# Patient Record
Sex: FEMALE | Race: WHITE | Employment: FULL TIME | URBAN - METROPOLITAN AREA
[De-identification: names, ages, dates, MRNs, and addresses within clinical notes are randomized per-mention and may not be internally consistent; named-entity substitution may affect disease eponyms.]

---

## 2019-05-08 ENCOUNTER — ANESTHESIA EVENT (OUTPATIENT)
Dept: ENDOSCOPY | Age: 44
End: 2019-05-08
Payer: COMMERCIAL

## 2019-05-08 RX ORDER — LISINOPRIL 20 MG/1
TABLET ORAL
COMMUNITY
Start: 2019-01-31

## 2019-05-08 RX ORDER — ALBUTEROL SULFATE 90 UG/1
2 AEROSOL, METERED RESPIRATORY (INHALATION) PRN
COMMUNITY
Start: 2019-02-06

## 2019-05-08 NOTE — PROGRESS NOTES
[address] [facility] time______10______        Surgery time____________    Take the following medications with a sip of water:lisinopril and inhaler    Do not eat or drink anything after 12:00 midnight prior to your surgery. This includes water chewing gum, mints and ice chips. You may brush your teeth and gargle the morning of your surgery, but do not swallow the water     Please see your family doctor/pediatrician for a history and physical and/or concerning medications. Bring any test results/reports from your physicians office. If you are under the care of a heart doctor or specialist doctor, please be aware that you may be asked to them for clearance    You may be asked to stop blood thinners such as Coumadin, Plavix, Fragmin, Lovenox, etc., or any anti-inflammatories such as:  Aspirin, Ibuprofen, Advil, Naproxen prior to your surgery. We also ask that you stop any OTC medications such as fish oil, vitamin E, glucosamine, garlic, Multivitamins, COQ 10, etc.    We ask that you do not smoke 24 hours prior to surgery  We ask that you do not  drink any alcoholic beverages 24 hours prior to surgery     You must make arrangements for a responsible adult to take you home after your surgery. For your safety you will not be allowed to leave alone or drive yourself home. Your surgery will be cancelled if you do not have a ride home. Also for your safety, it is strongly suggested that someone stay with you the first 24 hours after your surgery. A parent or legal guardian must accompany a child scheduled for surgery and plan to stay at the hospital until the child is discharged. Please do not bring other children with you. For your comfort, please wear simple loose fitting clothing to the hospital.  Please do not bring valuables.     Do not wear any make-up or nail polish on your fingers or toes      For your safety, please do not wear any jewelry or body piercing's on the day of surgery. All jewelry must be removed. If you have dentures, they will be removed before going to operating room. For your convenience, we will provide you with a container. If you wear contact lenses or glasses, they will be removed, please bring a case for them. If you have a living will and a durable power of  for healthcare, please bring in a copy. As part of our patient safety program to minimize surgical site infections, we ask you to do the following:    · Please notify your surgeon if you develop any illness between         now and the  day of your surgery. · This includes a cough, cold, fever, sore throat, nausea,         or vomiting, and diarrhea, etc.  ·  Please notify your surgeon if you experience dizziness, shortness         of breath or blurred vision between now and the time of your surgery. Do not shave your operative site 96 hours prior to surgery. For face and neck surgery, men may use an electric razor 48 hours   prior to surgery. You may shower the night before surgery or the morning of   your surgery with an antibacterial soap. You will need to bring a photo ID and insurance card    [facility] has an onsite pharmacy, would you like to utilize our pharmacy     If you will be staying overnight and use a C-pap machine, please bring   your C-pap to hospital     Our goal is to provide you with excellent care, therefore, visitors will be limited to two(2) in the room at a time so that we may focus on providing this care for you. Please contact pre-admission testing if you have any further questions. [facility] phone number:  [phone] [facility] fax number:  [phone]  Please note these are generalized instructions for all surgical cases, you may be provided with more specific instructions according to your surgery.

## 2019-05-09 ENCOUNTER — ANESTHESIA (OUTPATIENT)
Dept: ENDOSCOPY | Age: 44
End: 2019-05-09
Payer: COMMERCIAL

## 2019-05-09 ENCOUNTER — HOSPITAL ENCOUNTER (OUTPATIENT)
Age: 44
Setting detail: OUTPATIENT SURGERY
Discharge: HOME | End: 2019-05-09
Attending: INTERNAL MEDICINE | Admitting: INTERNAL MEDICINE
Payer: COMMERCIAL

## 2019-05-09 VITALS
DIASTOLIC BLOOD PRESSURE: 84 MMHG | RESPIRATION RATE: 25 BRPM | OXYGEN SATURATION: 96 % | SYSTOLIC BLOOD PRESSURE: 141 MMHG

## 2019-05-09 VITALS
DIASTOLIC BLOOD PRESSURE: 74 MMHG | HEIGHT: 62 IN | BODY MASS INDEX: 53.92 KG/M2 | RESPIRATION RATE: 18 BRPM | SYSTOLIC BLOOD PRESSURE: 149 MMHG | WEIGHT: 293 LBS | TEMPERATURE: 97.4 F | OXYGEN SATURATION: 97 % | HEART RATE: 59 BPM

## 2019-05-09 DIAGNOSIS — Z12.11 COLON CANCER SCREENING: ICD-10-CM

## 2019-05-09 LAB — PREGNANCY, URINE: NEGATIVE

## 2019-05-09 PROCEDURE — 3609010600 HC COLONOSCOPY POLYPECTOMY SNARE/COLD BIOPSY: Performed by: INTERNAL MEDICINE

## 2019-05-09 PROCEDURE — 2500000003 HC RX 250 WO HCPCS: Performed by: ANESTHESIOLOGY

## 2019-05-09 PROCEDURE — 7100000001 HC PACU RECOVERY - ADDTL 15 MIN: Performed by: INTERNAL MEDICINE

## 2019-05-09 PROCEDURE — 6360000002 HC RX W HCPCS: Performed by: NURSE ANESTHETIST, CERTIFIED REGISTERED

## 2019-05-09 PROCEDURE — 7100000011 HC PHASE II RECOVERY - ADDTL 15 MIN: Performed by: INTERNAL MEDICINE

## 2019-05-09 PROCEDURE — 7100000000 HC PACU RECOVERY - FIRST 15 MIN: Performed by: INTERNAL MEDICINE

## 2019-05-09 PROCEDURE — 7100000010 HC PHASE II RECOVERY - FIRST 15 MIN: Performed by: INTERNAL MEDICINE

## 2019-05-09 PROCEDURE — 88305 TISSUE EXAM BY PATHOLOGIST: CPT

## 2019-05-09 PROCEDURE — 2580000003 HC RX 258: Performed by: ANESTHESIOLOGY

## 2019-05-09 PROCEDURE — 84703 CHORIONIC GONADOTROPIN ASSAY: CPT

## 2019-05-09 PROCEDURE — 6360000002 HC RX W HCPCS: Performed by: ANESTHESIOLOGY

## 2019-05-09 PROCEDURE — 2709999900 HC NON-CHARGEABLE SUPPLY: Performed by: INTERNAL MEDICINE

## 2019-05-09 PROCEDURE — 2500000003 HC RX 250 WO HCPCS: Performed by: NURSE ANESTHETIST, CERTIFIED REGISTERED

## 2019-05-09 PROCEDURE — 3700000000 HC ANESTHESIA ATTENDED CARE: Performed by: INTERNAL MEDICINE

## 2019-05-09 PROCEDURE — 3700000001 HC ADD 15 MINUTES (ANESTHESIA): Performed by: INTERNAL MEDICINE

## 2019-05-09 RX ORDER — PROPOFOL 10 MG/ML
INJECTION, EMULSION INTRAVENOUS CONTINUOUS PRN
Status: DISCONTINUED | OUTPATIENT
Start: 2019-05-09 | End: 2019-05-09 | Stop reason: SDUPTHER

## 2019-05-09 RX ORDER — PROPOFOL 10 MG/ML
INJECTION, EMULSION INTRAVENOUS PRN
Status: DISCONTINUED | OUTPATIENT
Start: 2019-05-09 | End: 2019-05-09 | Stop reason: SDUPTHER

## 2019-05-09 RX ORDER — SODIUM CHLORIDE 0.9 % (FLUSH) 0.9 %
10 SYRINGE (ML) INJECTION EVERY 12 HOURS SCHEDULED
Status: DISCONTINUED | OUTPATIENT
Start: 2019-05-09 | End: 2019-05-09 | Stop reason: HOSPADM

## 2019-05-09 RX ORDER — SODIUM CHLORIDE 0.9 % (FLUSH) 0.9 %
10 SYRINGE (ML) INJECTION PRN
Status: DISCONTINUED | OUTPATIENT
Start: 2019-05-09 | End: 2019-05-09 | Stop reason: HOSPADM

## 2019-05-09 RX ORDER — ONDANSETRON 2 MG/ML
4 INJECTION INTRAMUSCULAR; INTRAVENOUS ONCE
Status: COMPLETED | OUTPATIENT
Start: 2019-05-09 | End: 2019-05-09

## 2019-05-09 RX ORDER — ONDANSETRON 2 MG/ML
4 INJECTION INTRAMUSCULAR; INTRAVENOUS
Status: DISCONTINUED | OUTPATIENT
Start: 2019-05-09 | End: 2019-05-09 | Stop reason: HOSPADM

## 2019-05-09 RX ORDER — SODIUM CHLORIDE 9 MG/ML
INJECTION, SOLUTION INTRAVENOUS CONTINUOUS
Status: DISCONTINUED | OUTPATIENT
Start: 2019-05-09 | End: 2019-05-09 | Stop reason: HOSPADM

## 2019-05-09 RX ORDER — LIDOCAINE HYDROCHLORIDE 20 MG/ML
INJECTION, SOLUTION EPIDURAL; INFILTRATION; INTRACAUDAL; PERINEURAL PRN
Status: DISCONTINUED | OUTPATIENT
Start: 2019-05-09 | End: 2019-05-09 | Stop reason: SDUPTHER

## 2019-05-09 RX ADMIN — LIDOCAINE HYDROCHLORIDE 50 MG: 20 INJECTION, SOLUTION EPIDURAL; INFILTRATION; INTRACAUDAL; PERINEURAL at 11:35

## 2019-05-09 RX ADMIN — FAMOTIDINE 20 MG: 10 INJECTION, SOLUTION INTRAVENOUS at 11:03

## 2019-05-09 RX ADMIN — ONDANSETRON 4 MG: 2 INJECTION INTRAMUSCULAR; INTRAVENOUS at 11:08

## 2019-05-09 RX ADMIN — PROPOFOL 120 MCG/KG/MIN: 10 INJECTION, EMULSION INTRAVENOUS at 11:37

## 2019-05-09 RX ADMIN — PROPOFOL 50 MG: 10 INJECTION, EMULSION INTRAVENOUS at 11:35

## 2019-05-09 RX ADMIN — PROPOFOL 50 MG: 10 INJECTION, EMULSION INTRAVENOUS at 11:39

## 2019-05-09 RX ADMIN — SODIUM CHLORIDE: 9 INJECTION, SOLUTION INTRAVENOUS at 11:03

## 2019-05-09 ASSESSMENT — PULMONARY FUNCTION TESTS
PIF_VALUE: 0

## 2019-05-09 ASSESSMENT — PAIN SCALES - GENERAL
PAINLEVEL_OUTOF10: 0

## 2019-05-09 ASSESSMENT — ENCOUNTER SYMPTOMS: SHORTNESS OF BREATH: 0

## 2019-05-09 ASSESSMENT — PAIN - FUNCTIONAL ASSESSMENT: PAIN_FUNCTIONAL_ASSESSMENT: 0-10

## 2019-05-09 NOTE — PROGRESS NOTES
To pacu from endo. Pt asleep. Wakes easily. Denies pain. Abd soft. Pt back to sleep. IV infusing. Monitor in sinus rhythm.

## 2019-05-09 NOTE — ANESTHESIA POSTPROCEDURE EVALUATION
Department of Anesthesiology  Postprocedure Note    Patient: Odette Long  MRN: [id number]  YOB: 1975  Date of evaluation: 5/9/2019  Time:  5:12 PM     Procedure Summary     Date:  05/09/19 Room / Location:  [facility] 04 / [facility] ENDOSCOPY    Anesthesia Start:  1129 Anesthesia Stop:  8147    Procedure:  COLONOSCOPY POLYPECTOMY SNARE/COLD BIOPSY (N/A ) Diagnosis:       Colon cancer screening      (COLON CANCER SCREENING)    Surgeon:  Sarah Carr MD Responsible Provider:  Hernan Matos MD    Anesthesia Type:  TIVA ASA Status:  3          Anesthesia Type: TIVA    Alissa Phase I: Alissa Score: 10    Alissa Phase II: Alissa Score: 10    Last vitals: Reviewed and per EMR flowsheets.    Vitals:    05/09/19 1216 05/09/19 1217 05/09/19 1225 05/09/19 1317   BP: 131/88  (!) 142/90 (!) 149/74   Pulse: 68 62 73 59   Resp: 13 13 16 18   Temp:   97.4 °F (36.3 °C)    TempSrc:   Temporal    SpO2: 94% 96% 96% 97%   Weight:       Height:           Anesthesia Post Evaluation    Patient location during evaluation: bedside  Patient participation: complete - patient participated  Level of consciousness: awake and alert  Airway patency: patent  Nausea & Vomiting: no nausea  Complications: no  Cardiovascular status: hemodynamically stable  Respiratory status: acceptable  Hydration status: euvolemic

## 2019-05-09 NOTE — ANESTHESIA PRE PROCEDURE
Department of Anesthesiology  Preprocedure Note       Name:  Veronica López   Age:  37 y.o.  :  1975                                          MRN:  [id number]         Date:  2019      Surgeon: Charlene Solis):  Jose Daniel Claire MD    Procedure: COLONOSCOPY (N/A )    Medications prior to admission:   Prior to Admission medications    Medication Sig Start Date End Date Taking? Authorizing Provider   lisinopril (PRINIVIL;ZESTRIL) 20 MG tablet  19  Yes Historical Provider, MD   metFORMIN (GLUCOPHAGE) 1000 MG tablet Take 500 mg by mouth 2 times daily (with meals)   Yes Historical Provider, MD   albuterol sulfate  (90 Base) MCG/ACT inhaler Inhale 2 puffs into the lungs as needed  19   Historical Provider, MD   Acetaminophen (TYLENOL PO) Take  by mouth. Historical Provider, MD   diclofenac sodium (VOLTAREN) 1 % GEL Apply 4 g topically 4 times daily for 30 days. 14  Bernadette Rey MD       Current medications:    No current facility-administered medications for this encounter. Allergies: Allergies   Allergen Reactions    Shellfish-Derived Products     Aspirin Nausea And Vomiting       Problem List:  There is no problem list on file for this patient.       Past Medical History:        Diagnosis Date    Hypertension     Obesity     PCOS (polycystic ovarian syndrome)     Seasonal allergies     Wears contact lenses        Past Surgical History:        Procedure Laterality Date    ANKLE SURGERY Right 2008    Dr. Kraig Lorenzana HISTORY  2014 & 2014    fistula repair (twice)    UVULECTOMY             Social History:    Social History     Tobacco Use    Smoking status: Never Smoker    Smokeless tobacco: Never Used    Tobacco comment: was a social smoker, hasnt smoked in about 15 years   Substance Use Topics    Alcohol use: Yes     Comment: occasionally                                Counseling given: Not Answered  Comment: hypothyroidism, hyperthyroidism, blood dyscrasia, arthritis               Abdominal:           Vascular:                                    Anesthesia Plan      TIVA     ASA 3       Induction: intravenous. Anesthetic plan and risks discussed with patient. Plan discussed with CRNA. This pre-anesthesia assessment may be used as a history and physical.    DOS STAFF ADDENDUM:    Pt seen and examined, chart reviewed (including anesthesia, drug and allergy history). No interval changes to history and physical examination. Anesthetic plan, risks, benefits, alternatives, and personnel involved discussed with patient. Patient verbalized an understanding and agrees to proceed.       Nelly Otto MD  May 9, 2019  10:37 AM      Nelly Otto MD   5/9/2019

## 2019-05-09 NOTE — H&P
[address] [place] [address]   Pre-operative History and Physical    Patient: Aleena Nair  : 1975  Acct#:     HISTORY OF PRESENT ILLNESS:    The patient is a 37 y.o. female who presents with Screening colonoscopy with family history of colon cancer in mother at 61 and father at 72. Last colon negative for polyps in . Past Medical History:        Diagnosis Date    Hypertension     Obesity     PCOS (polycystic ovarian syndrome)     Seasonal allergies     Wears contact lenses       Past Surgical History:        Procedure Laterality Date    ANKLE SURGERY Right 2008    Dr. Dk Grover HISTORY  2014 & 2014    fistula repair (twice)    UVULECTOMY            Medications Prior to Admission:   No current facility-administered medications on file prior to encounter. Current Outpatient Medications on File Prior to Encounter   Medication Sig Dispense Refill    albuterol sulfate  (90 Base) MCG/ACT inhaler Inhale 2 puffs into the lungs as needed       lisinopril (PRINIVIL;ZESTRIL) 20 MG tablet       metFORMIN (GLUCOPHAGE) 1000 MG tablet Take 500 mg by mouth 2 times daily (with meals)      Acetaminophen (TYLENOL PO) Take  by mouth.  diclofenac sodium (VOLTAREN) 1 % GEL Apply 4 g topically 4 times daily for 30 days.  5 Tube 2        Allergies:  Shellfish-derived products and Aspirin    Social History:   Social History     Socioeconomic History    Marital status: Single     Spouse name: Not on file    Number of children: Not on file    Years of education: Not on file    Highest education level: Not on file   Occupational History    Occupation: Anaylst     Employer: [employer]   Social Needs    Financial resource strain: Not on file    Food insecurity:     Worry: Not on file     Inability: Not on file    Transportation needs:     Medical: Not on file     Non-medical: Not on file   Tobacco Use    Smoking status: Never Smoker    Smokeless tobacco: Never Used    Tobacco comment: was a social smoker, hasnt smoked in about 15 years   Substance and Sexual Activity    Alcohol use: Yes     Comment: occasionally    Drug use: Never    Sexual activity: Yes   Lifestyle    Physical activity:     Days per week: Not on file     Minutes per session: Not on file    Stress: Not on file   Relationships    Social connections:     Talks on phone: Not on file     Gets together: Not on file     Attends Gnosticism service: Not on file     Active member of club or organization: Not on file     Attends meetings of clubs or organizations: Not on file     Relationship status: Not on file    Intimate partner violence:     Fear of current or ex partner: Not on file     Emotionally abused: Not on file     Physically abused: Not on file     Forced sexual activity: Not on file   Other Topics Concern    Not on file   Social History Narrative    Not on file      Family History:       Problem Relation Age of Onset    Cancer Other     Heart Disease Other         PHYSICAL EXAM:      BP (!) 143/110   Pulse 85   Temp 97.7 °F (36.5 °C)   Resp 14   Ht 5' 2\" (1.575 m)   Wt 294 lb 15.6 oz (133.8 kg)   LMP 04/24/2019   SpO2 98%   BMI 53.95 kg/m²  I        Heart:  RRR    Lungs:  CTA b    Abdomen:  S/NT/ND/+BS      ASSESSMENT AND PLAN:  ASA: per anesthesia  Mallampati: per anesthesia  1. Patient is a 37 y.o. female here for colonoscpoy   2. Procedure options, risks and benefits reviewed with the patient. The patient expresses understanding.     Chiki Freeman

## 2019-05-09 NOTE — OP NOTE
[address] [place] [address]  Endoscopy Note    Patient: Domenico Trevino  : 1975  Acct#:     Procedure: Colonoscopy with intubation of the terminal ileum  Colonoscopy with snare polypectomy    Date:  2019    Surgeon:  Angélica Rojo MD    Referring Physician:  Dr. Toño Hanna    Anesthesia:  TIVA    Indications: This is a 37y.o. year old female who presents today with  Screening colonoscopy with family history of colon cancer in mother at 61 and father at 72. Last colon negative for polyps in . Previous Colonoscopy: YES  Date:   Greater than 3 years: YES      Procedure: An informed consent was obtained from the patient after explanation of indications, benefits, possible risks and complications of the procedure. The patient was then taken to the endoscopy suite, placed in the left lateral decubitus position, and the above IV anesthesia was administered. A digital rectal examination was performed and revealed negative without mass, lesions or tenderness. The Olympus pediatric video colonoscope was placed in the patient's rectum under digital direction and advanced to the cecum. The cecum was identified by characteristic anatomy and ballottment. The prep was good. The ileocecal valve was identified and intubated. Retroflexed view of the cecum was normal.  The ascending colon was examined twice to assure no sessile polyps missed. The scope was then withdrawn back through the cecum, ascending, transverse, descending and sigmoid colons. Carefull circumferential examination of the mucosa in these areas was performed. The scope was then withdrawn into the rectum and retroflexed. The scope was straightened, the colon was decompressed and the scope was withdrawn from the patient. Findings:  1. Normal Ileum  2. A 9mm polyp was identified in the ascending colon and removed completely with hot snare polypectomy down to a clean base confirmed by post-polypectomy photograph.   All

## 2020-02-11 ENCOUNTER — OFFICE VISIT (OUTPATIENT)
Dept: ORTHOPEDIC SURGERY | Age: 45
End: 2020-02-11
Payer: COMMERCIAL

## 2020-02-11 VITALS — WEIGHT: 293 LBS | BODY MASS INDEX: 53.92 KG/M2 | HEIGHT: 62 IN

## 2020-02-11 PROCEDURE — G8417 CALC BMI ABV UP PARAM F/U: HCPCS | Performed by: ORTHOPAEDIC SURGERY

## 2020-02-11 PROCEDURE — 20610 DRAIN/INJ JOINT/BURSA W/O US: CPT | Performed by: ORTHOPAEDIC SURGERY

## 2020-02-11 PROCEDURE — 99203 OFFICE O/P NEW LOW 30 MIN: CPT | Performed by: ORTHOPAEDIC SURGERY

## 2020-02-11 PROCEDURE — G8484 FLU IMMUNIZE NO ADMIN: HCPCS | Performed by: ORTHOPAEDIC SURGERY

## 2020-02-11 PROCEDURE — G8427 DOCREV CUR MEDS BY ELIG CLIN: HCPCS | Performed by: ORTHOPAEDIC SURGERY

## 2020-02-11 PROCEDURE — 1036F TOBACCO NON-USER: CPT | Performed by: ORTHOPAEDIC SURGERY

## 2020-02-11 RX ORDER — HYDROCODONE BITARTRATE AND ACETAMINOPHEN 5; 325 MG/1; MG/1
TABLET ORAL
COMMUNITY
Start: 2020-02-09 | End: 2021-06-24 | Stop reason: ALTCHOICE

## 2020-02-11 RX ORDER — METHYLPREDNISOLONE ACETATE 40 MG/ML
80 INJECTION, SUSPENSION INTRA-ARTICULAR; INTRALESIONAL; INTRAMUSCULAR; SOFT TISSUE ONCE
Status: COMPLETED | OUTPATIENT
Start: 2020-02-11 | End: 2020-02-11

## 2020-02-11 RX ORDER — NAPROXEN 500 MG/1
500 TABLET ORAL 2 TIMES DAILY WITH MEALS
Qty: 60 TABLET | Refills: 0 | Status: SHIPPED | OUTPATIENT
Start: 2020-02-11 | End: 2020-05-26

## 2020-02-11 RX ORDER — LIDOCAINE HYDROCHLORIDE 10 MG/ML
3 INJECTION, SOLUTION INFILTRATION; PERINEURAL ONCE
Status: COMPLETED | OUTPATIENT
Start: 2020-02-11 | End: 2020-02-11

## 2020-02-11 RX ORDER — NAPROXEN 500 MG/1
TABLET ORAL
COMMUNITY
Start: 2020-02-09 | End: 2020-02-11 | Stop reason: ALTCHOICE

## 2020-02-11 RX ADMIN — METHYLPREDNISOLONE ACETATE 80 MG: 40 INJECTION, SUSPENSION INTRA-ARTICULAR; INTRALESIONAL; INTRAMUSCULAR; SOFT TISSUE at 14:41

## 2020-02-11 RX ADMIN — LIDOCAINE HYDROCHLORIDE 3 ML: 10 INJECTION, SOLUTION INFILTRATION; PERINEURAL at 14:40

## 2020-02-11 ASSESSMENT — ENCOUNTER SYMPTOMS
ALLERGIC/IMMUNOLOGIC COMMENTS: SHELLFISH
GASTROINTESTINAL NEGATIVE: 1
EYES NEGATIVE: 1
RESPIRATORY NEGATIVE: 1

## 2020-02-11 NOTE — PROGRESS NOTES
Subjective:      Patient ID: Ron Meyers is a 53 y.o. female. TONE Meyers is seen today for evaluation of right knee injury. On February 7, 2020 she was walking upstairs and felt a pop. She immediately experienced severe right knee pain. She was seen in the emergency room and given Ace bandage, knee immobilizer, and crutches. Pain is currently 5 out of 10. It hurts throughout the knee but mostly in the back and medial aspect of the knee. She is taking some Vicodin as well as naproxen and ice. She has pain bending. About 1 week prior to this injury she vaguely recalls falling down some stairs but was not having pain immediately prior to this episode. I did surgery in September 2008 for her right ankle fracture. Review of Systems   Constitutional: Negative. HENT: Negative. Eyes: Negative. Respiratory: Negative. Cardiovascular: Negative. Gastrointestinal: Negative. Endocrine: Negative. Genitourinary: Negative. Musculoskeletal: Positive for arthralgias, gait problem and joint swelling. Right knee pain   Skin: Negative. Allergic/Immunologic: Positive for food allergies. Shellfish    Hematological: Negative. Psychiatric/Behavioral: Negative. Objective:   Physical Exam  General Exam:    Vitals: Height 5' 2\" (1.575 m), weight 300 lb (136.1 kg). She is morbidly obese. Constitutional: Patient is adequately groomed with no evidence of malnutrition  Mental Status: The patient is oriented to time, place and person. The patient's mood and affect are appropriate. Gait:  Patient walks with crutches, knee immobilizer, and a stifflegged gait. Lymphatic: The lymphatic examination bilaterally reveals all areas to be without enlargement or induration. Vascular: Examination reveals no swelling or calf tenderness. Peripheral pulses are palpable and 2+. Neurological: The patient has good coordination.   There is no weakness or sensory deficit. Skin:    Head/Neck: inspection reveals no rashes, ulcerations or lesions. Trunk:  inspection reveals no rashes, ulcerations or lesions. Right Lower Extremity: inspection reveals no rashes, ulcerations or lesions. Left Lower Extremity: inspection reveals no rashes, ulcerations or lesions. Examination of the bilateral hips reveals normal flexion and extension. There is no restriction in rotation. There is no tenderness to palpation anteriorly posteriorly or laterally. Left knee examination demonstrates no effusion. There is no tenderness to palpation over the medial or lateral joint line. There is no discomfort over the patellar tendon. There is no palpable popliteal cyst.  Sensation is intact. Range of motion is by body habitus from 0 to 100 degrees. .  There is no patellofemoral crepitation. There is no instability to varus or valgus stress applied at 0, 30, 60, or 90Â° of flexion. There is no anterior or posterior drawer. Lachman examination is normal.      Right knee examination has tenderness medially and laterally. She has a moderate effusion. I cannot detect significant instability. Range of motion 0 to about 85 degrees because of discomfort. She does have tenderness with the medial and lateral joint lines and pain with Samantha's maneuver. Calf is soft without DVT. She is a well-healed lateral ankle incision. X-rays were obtained today. AP standing, PA flexed, and merchant views of the bilateral knees as well as a lateral of the right knee. These demonstrate:  No acute bony abnormalities. She has moderate patellofemoral arthritic change and mild lateral change. Assessment:      Right knee sprain versus meniscal tear      Plan: We will start with an aspiration and injection today. If her pain does not tram in the next 2 weeks I recommend an MRI scan to evaluate for meniscus tear. She agrees.     Procedure: Under sterile technique, the right knee was anesthetized in the suprapatellar pouch. I then aspirated the right knee of approximately 30 cc of serosanguineous fluid. Right knee was then injected with 80 mg of Depo-Medrol. She tolerated that well. She will use her crutches. She can wean from the immobilizer. I refilled her naproxen. Follow-up with me in a week. This note was created using voice recognition software. It has been proofread, but occasionally errors remain. Please disregard these errors. They will be corrected as they are noted.

## 2020-02-18 ENCOUNTER — OFFICE VISIT (OUTPATIENT)
Dept: ORTHOPEDIC SURGERY | Age: 45
End: 2020-02-18
Payer: COMMERCIAL

## 2020-02-18 VITALS — HEIGHT: 62 IN | WEIGHT: 293 LBS | BODY MASS INDEX: 53.92 KG/M2

## 2020-02-18 PROCEDURE — G8484 FLU IMMUNIZE NO ADMIN: HCPCS | Performed by: ORTHOPAEDIC SURGERY

## 2020-02-18 PROCEDURE — 1036F TOBACCO NON-USER: CPT | Performed by: ORTHOPAEDIC SURGERY

## 2020-02-18 PROCEDURE — G8427 DOCREV CUR MEDS BY ELIG CLIN: HCPCS | Performed by: ORTHOPAEDIC SURGERY

## 2020-02-18 PROCEDURE — G8417 CALC BMI ABV UP PARAM F/U: HCPCS | Performed by: ORTHOPAEDIC SURGERY

## 2020-02-18 PROCEDURE — 99212 OFFICE O/P EST SF 10 MIN: CPT | Performed by: ORTHOPAEDIC SURGERY

## 2020-05-26 RX ORDER — NAPROXEN 500 MG/1
TABLET ORAL
Qty: 60 TABLET | Refills: 0 | Status: SHIPPED | OUTPATIENT
Start: 2020-05-26

## 2021-06-02 ENCOUNTER — CLINICAL DOCUMENTATION (OUTPATIENT)
Dept: OTHER | Age: 46
End: 2021-06-02

## 2021-06-24 ENCOUNTER — OFFICE VISIT (OUTPATIENT)
Dept: ORTHOPEDIC SURGERY | Age: 46
End: 2021-06-24
Payer: COMMERCIAL

## 2021-06-24 VITALS
HEART RATE: 89 BPM | SYSTOLIC BLOOD PRESSURE: 172 MMHG | HEIGHT: 62 IN | WEIGHT: 293 LBS | DIASTOLIC BLOOD PRESSURE: 107 MMHG | RESPIRATION RATE: 12 BRPM | BODY MASS INDEX: 53.92 KG/M2

## 2021-06-24 DIAGNOSIS — M23.42 LOOSE BODY OF LEFT KNEE: ICD-10-CM

## 2021-06-24 DIAGNOSIS — M25.562 LEFT KNEE PAIN, UNSPECIFIED CHRONICITY: Primary | ICD-10-CM

## 2021-06-24 PROCEDURE — 99214 OFFICE O/P EST MOD 30 MIN: CPT | Performed by: ORTHOPAEDIC SURGERY

## 2021-06-24 RX ORDER — MONTELUKAST SODIUM 10 MG/1
TABLET ORAL
COMMUNITY
Start: 2021-06-14

## 2021-06-24 RX ORDER — VITAMIN E/ALOE VERA
GEL (GRAM) TOPICAL EVERY OTHER DAY
COMMUNITY
Start: 2021-05-06

## 2021-06-24 RX ORDER — BUTALBITAL, ACETAMINOPHEN AND CAFFEINE 50; 325; 40 MG/1; MG/1; MG/1
TABLET ORAL
COMMUNITY
Start: 2021-01-25

## 2021-06-24 RX ORDER — ERGOCALCIFEROL 1.25 MG/1
CAPSULE ORAL
COMMUNITY
Start: 2021-05-29

## 2021-06-24 RX ORDER — CETIRIZINE HYDROCHLORIDE 10 MG/1
10 TABLET ORAL DAILY
COMMUNITY

## 2021-06-24 ASSESSMENT — ENCOUNTER SYMPTOMS
RESPIRATORY NEGATIVE: 1
GASTROINTESTINAL NEGATIVE: 1
EYES NEGATIVE: 1
ALLERGIC/IMMUNOLOGIC COMMENTS: SHELLFISH

## 2021-06-24 NOTE — PROGRESS NOTES
Subjective:      Patient ID: Jackeline Salazar is a 50 y.o. female. HPI  Jackeline Salazar is seen today for evaluation of her left knee. She feels like something is catching and shifting her knee. When it catches she cannot walk. She feel like she has to shift her knee back into place and then she can bear weight again. When it gives out is 10 out of 10 pain. She had left foot fusion surgery and was nonweightbearing for 10 weeks. She is now allowed to walk again and her knee is debilitating. Review of Systems   Constitutional: Negative. HENT: Negative. Eyes: Negative. Respiratory: Negative. Cardiovascular: Negative. Gastrointestinal: Negative. Endocrine: Negative. Genitourinary: Negative. Musculoskeletal: Negative. Skin: Positive for wound. Post op wound on left foot   Allergic/Immunologic: Positive for food allergies. Shellfish   Neurological: Negative. Hematological: Negative. Psychiatric/Behavioral: Negative. Objective:   Physical Exam  History: Patient's relevant past family, medical, and social history are reviewed as part of today's visit. ROS of pertinent positives and negatives as above; otherwise negative. General Exam:    Vitals: Blood pressure (!) 172/107, pulse 89, resp. rate 12, height 5' 2\" (1.575 m), weight 300 lb (136.1 kg). Constitutional: Patient is adequately groomed with no evidence of malnutrition  Mental Status: The patient is oriented to time, place and person. The patient's mood and affect are appropriate. Gait:  Patient walks with a boot on her left foot. Lymphatic: The lymphatic examination bilaterally reveals all areas to be without enlargement or induration. Vascular: Examination reveals no swelling or calf tenderness. Peripheral pulses are palpable and 2+. Neurological: The patient has good coordination. There is no weakness or sensory deficit.     Skin:    Head/Neck: inspection reveals no rashes, ulcerations or lesions. Trunk:  inspection reveals no rashes, ulcerations or lesions. Right Lower Extremity: inspection reveals no rashes, ulcerations or lesions. Left Lower Extremity: inspection reveals no rashes, ulcerations or lesions. She does have a nonhealing wound on her foot. Right knee has mild crepitation but no joint line pain or swelling. Left knee has moderate crepitation. She feels discomfort posteriorly with terminal flexion range of motion 0 to 115 degrees. She has a small effusion but no warmth erythema or evidence of infection. She has moderate medial joint line pain. X-rays were obtained today office and interpreted by me. AP standing, PA flexed, and merchant views of the bilateral knees as well as a lateral of the left knee. These demonstrate: Mild patellofemoral change bilaterally        Assessment:      Clinical symptoms concerning for loose body left knee      Plan:      MRI scan left knee. I cannot do an injection today because of her nonhealing wound. Follow-up with me after the scan. This note was created using voice recognition software. It has been proofread, but occasionally errors remain. Please disregard these errors. They will be corrected as they are noted.

## 2021-06-29 ENCOUNTER — OFFICE VISIT (OUTPATIENT)
Dept: ORTHOPEDIC SURGERY | Age: 46
End: 2021-06-29
Payer: COMMERCIAL

## 2021-06-29 VITALS
HEART RATE: 74 BPM | WEIGHT: 293 LBS | DIASTOLIC BLOOD PRESSURE: 114 MMHG | HEIGHT: 62 IN | BODY MASS INDEX: 53.92 KG/M2 | SYSTOLIC BLOOD PRESSURE: 193 MMHG

## 2021-06-29 DIAGNOSIS — S83.242A ACUTE MEDIAL MENISCUS TEAR OF LEFT KNEE, INITIAL ENCOUNTER: Primary | ICD-10-CM

## 2021-06-29 PROCEDURE — 99214 OFFICE O/P EST MOD 30 MIN: CPT | Performed by: ORTHOPAEDIC SURGERY

## 2021-06-29 NOTE — PROGRESS NOTES
Amanda Woodson returns today to follow-up her left knee MRI. Pain is still 1 out of 10. I have reviewed the patient's medical history in detail and updated the computerized patient record. General Exam:    Vitals: Blood pressure (!) 193/114, pulse 74, height 5' 2\" (1.575 m), weight 299 lb (135.6 kg). Constitutional: Patient is adequately groomed with no evidence of malnutrition  Mental Status: The patient is oriented to time, place and person. The patient's mood and affect are appropriate. She wears a boot on the left foot. Left foot has a nonhealing wound anteriorly with purulent drainage. No warmth erythema is noted. Left knee has tenderness over the posterior lateral aspect of the knee but no drainable effusion. Calf is soft. Neurologic and vascular exams are normal.  Range of motion 0 to 110 degrees limited by body habitus. Left knee MRI is reviewed. It demonstrates     FINDINGS:  A probable radial tear of the lateral aspect of posterior horn of the medial    meniscus adjacent to the meniscal root is present (coronal 3D gradient-echo series 6 image 18    and sagittal PD fat-sat series 7 image 16) with associated medial extrusion of the body of the    medial meniscus (coronal 3D gradient-echo series 6, image 22).   Intrasubstance degeneration    more medial aspect of the posterior horn and posterior body medial meniscus is also present.       There is no evidence of lateral meniscal tear.       The ACL, PCL, medial and lateral collateral ligaments and extensor mechanism are intact.       No chondromalacia is visualized within the medial, lateral or patellofemoral compartments.       There is no evidence of fracture, stress fracture or osteochondral defect within the left knee.       A small Baker's cyst is present within the posteromedial aspect of the knee.       A trace knee effusion is present.  No evidence of intraarticular loose body.       Subcutaneous edema/soft tissue swelling is present within the anterior aspect of the left knee.       CONCLUSION:   1. Probable radial tear of the lateral aspect of the posterior horn of the medial meniscus near    the meniscal root with medial extrusion of the body of the medial meniscus. 2. Small Baker's cyst.   3. Trace knee effusion with no evidence of intraarticular loose body. 4. Subcutaneous edema/soft tissue swelling within the anterior aspect of the left knee.         I reviewed these findings on the report and the images with the patient. Assessment: Probable radial root tear left knee medial meniscus. Plan: We reviewed the risks, benefits, and alternatives to surgery. The alternatives include conservative management including medications, injections, and physical therapy as well as observation. Risks of surgery include but are not limited to persistent pain, instability, and reinjury. Risks also include risk of infection which could result in the need for further surgery and long-term use of antibiotics. Risks also include deep venous thromboses and pulmonary emboli. Risks also include problems with anesthesia including but not limited to cardiovascular compromise , stroke,  and death. The patient understands that the goal of surgery is to improve pain and function but that can never be guaranteed. We discussed root repair as a way to try to preserve her joint function. She is very hesitant to consider surgery given that she just came off of protected weightbearing for her foot and is still struggling with nonhealing wound. At this point we need to let her nonhealing wound resolved before we could consider any intervention. She will follow up with me at that time. She agrees with this plan and all questions have been answered. Medical decision making today was moderate. This note was created using voice recognition software. It has been proofread, but occasionally errors remain. Please disregard these errors. They will be corrected as they are noted.

## (undated) DEVICE — SINGLE-USE POLYPECTOMY SNARE: Brand: CAPTIFLEX

## (undated) DEVICE — ELECTRODE PT RET AD L9FT HI MOIST COND ADH HYDRGEL CORDED

## (undated) DEVICE — ENDOSCOPY KIT: Brand: MEDLINE INDUSTRIES, INC.